# Patient Record
(demographics unavailable — no encounter records)

---

## 2025-07-24 NOTE — CARE PLAN
[Date: ___] : Date: [unfilled] [FreeTextEntry6] : Dr Balbir Gamez- ENT- [phone]; : Mily and NP is Veronika Rodriguez [FreeTextEntry7] : lungs are normal at birth - no special things to do or look for [FreeTextEntry8] : nothing to do [FreeTextEntry9] : continue current feeding; she is doing very well. we monitor for oily stools, excess fussiness

## 2025-07-24 NOTE — BIRTH HISTORY
[At ___ Weeks Gestation] : at [unfilled] weeks gestation [Normal Vaginal Route] : by normal vaginal route [None] : there were no delivery complications [Age Appropriate] : age appropriate developmental milestones met [] : Not  [de-identified] : Good Javi  [FreeTextEntry1] : 6 lb 10 oz [FreeTextEntry6] : 19 in [FreeTextEntry7] : [place] [FreeTextEntry9] : Cat, Chinese [de-identified] : Romanian, Cat, Yemeni

## 2025-07-24 NOTE — PHYSICAL EXAM
[Well Nourished] : well nourished [Well Developed] : well developed [Well Groomed] : well groomed [Alert] : ~L alert [Active] : active [Normal Breathing Pattern] : normal breathing pattern [No Respiratory Distress] : no respiratory distress [No Allergic Shiners] : no allergic shiners [No Drainage] : no drainage [No Conjunctivitis] : no conjunctivitis [Tympanic Membranes Clear] : tympanic membranes were clear [Nasal Mucosa Non-Edematous] : nasal mucosa non-edematous [No Nasal Drainage] : no nasal drainage [No Polyps] : no polyps [No Sinus Tenderness] : no sinus tenderness [No Oral Pallor] : no oral pallor [No Oral Cyanosis] : no oral cyanosis [Non-Erythematous] : non-erythematous [No Exudates] : no exudates [No Postnasal Drip] : no postnasal drip [No Tonsillar Enlargement] : no tonsillar enlargement [Absence Of Retractions] : absence of retractions [Symmetric] : symmetric [Good Expansion] : good expansion [No Acc Muscle Use] : no accessory muscle use [Good aeration to bases] : good aeration to bases [Equal Breath Sounds] : equal breath sounds bilaterally [No Crackles] : no crackles [No Rhonchi] : no rhonchi [No Wheezing] : no wheezing [Normal Sinus Rhythm] : normal sinus rhythm [No Heart Murmur] : no heart murmur [Soft, Non-Tender] : soft, non-tender [No Hepatosplenomegaly] : no hepatosplenomegaly [Non Distended] : was not ~L distended [Abdomen Mass (___ Cm)] : no abdominal mass palpated [Full ROM] : full range of motion [No Clubbing] : no clubbing [Capillary Refill < 2 secs] : capillary refill less than two seconds [No Cyanosis] : no cyanosis [No Petechiae] : no petechiae [No Edema] : no edema [No Kyphoscoliosis] : no kyphoscoliosis [No Contractures] : no contractures [Alert and  Oriented] : alert and oriented [No Abnormal Focal Findings] : no abnormal focal findings [Normal Muscle Tone And Reflexes] : normal muscle tone and reflexes [No Birth Marks] : no birth marks [No Rashes] : no rashes [No Skin Lesions] : no skin lesions

## 2025-07-24 NOTE — BIRTH HISTORY
[At ___ Weeks Gestation] : at [unfilled] weeks gestation [Normal Vaginal Route] : by normal vaginal route [None] : there were no delivery complications [Age Appropriate] : age appropriate developmental milestones met [] : Not  [de-identified] : Good Javi  [FreeTextEntry1] : 6 lb 10 oz [FreeTextEntry6] : 19 in [FreeTextEntry7] : [place] [FreeTextEntry9] : Cat, Greenlandic [de-identified] : Spanish, Cat, Moroccan

## 2025-07-24 NOTE — DISCUSSION/SUMMARY
[FreeTextEntry1] : 6 day old- full term NVD, no delivery complications presently drinking formula every 2-3 hrs without spitting up or vomiting Does make some post- feeding expiratory tight sounding, wheeze-like sounds noted on video that Dad shared. She does not seem to make this sound all the time.  No steatorrhea reported; stool brought in today for fecal elastase and it appears to be a normal stool, no oil noted  will see back in 2-4 weeks; Sweat test  is too big today. will plan for when she is closer to 8 lbs

## 2025-07-24 NOTE — CARE PLAN
[Date: ___] : Date: [unfilled] [FreeTextEntry6] : Dr Balbir Gamez- ENT- [phone]; : Mily and NP is Veronika Rodriguez [FreeTextEntry7] : lungs are normal at birth - no special things to do or look for [FreeTextEntry8] : nothing to do [FreeTextEntry9] : continue current feeding; she is doing very well. we monitor for oily stools, excess fussiness

## 2025-07-24 NOTE — HISTORY OF PRESENT ILLNESS
[Parents] : parents [FreeTextEntry1] : Initial consultation for a 6 day old infant identified prenatally to have CF.  Parents had genetic screening for CF during this pregnancy , both were identified to be carriers and amnio revealed that fetus was affected with CF.  CF Variants identified R117H 7T/ [id number] 7T. New born screen ins pending, IRT was elevated. Sweat test to be completed when infant is larger.   Baby was born at [facility] via vaginal delivery at 38 weeks  Birth weight: 6 lb 10 oz 7/18/25 discharge weight: 6lb 6 oz    7/19/25  weight today: 6lb 5 oz Growth velocity too young to calculate. Returning to PMD on Monday for a weight check.  GI: Feeds q 2.5hrs 2oz  Enfamil Neuro Pro  Baby is contented between feeds. Stools 8x/day - with each feed. Yellow, seedy, no oil   Minimal Spiting up  Pulm: Noisy breathing with feeds.  reviewed the video and concern for noisy expiratory wheezes/ tight sounding   Social: lives in private home with natural parents. [place] [facility]  Mom is on maternity leave until December. Dad works for [employer]- 3 weeks of leave.  All grandparents are vaccinated.

## 2025-07-24 NOTE — HISTORY OF PRESENT ILLNESS
[Parents] : parents [FreeTextEntry1] : Initial consultation for a 6 day old infant identified prenatally to have CF.  Parents had genetic screening for CF during this pregnancy , both were identified to be carriers and amnio revealed that fetus was affected with CF.  CF Variants identified R117H 7T/ [id number] 7T. New born screen ins pending, IRT was elevated. Sweat test to be completed when infant is larger.   Baby was born at [facility] via vaginal delivery at 38 weeks  Birth weight: 6 lb 10 oz 7/18/25 discharge weight: 6lb 6 oz    7/19/25  weight today: 6lb 5 oz Growth velocity too young to calculate. Returning to PMD on Monday for a weight check.  GI: Feeds q 2.5hrs 2oz  Enfamil Neuro Pro  Baby is contented between feeds. Stools 8x/day - with each feed. Yellow, seedy, no oil   Minimal Spiting up  Pulm: Noisy breathing with feeds.  reviewed the video and concern for noisy expiratory wheezes/ tight sounding   Social: lives in private home with natural parents. Living With [facility].  Mom is on maternity leave until December. Dad works for [employer]- 3 weeks of leave.  All grandparents are vaccinated.

## 2025-07-25 NOTE — HISTORY OF PRESENT ILLNESS
[Born at ___ Wks Gestation] : The patient was born at [unfilled] weeks gestation [Other: _____] : at [unfilled] [BW: _____] : weight of [unfilled] [Length: _____] : length of [unfilled] [HC: _____] : head circumference of [unfilled] [DW: _____] : Discharge weight was [unfilled] [Time of Birth: _____] : Time of birth was [unfilled] [] : via normal spontaneous vaginal delivery [G: ___] : G [unfilled] [P: ___] : P [unfilled] [Rubella (Immune)] : Rubella immune [Normal] : Normal [In Bassinet/Crib] : sleeps in bassinet/crib [On back] : sleeps on back [No] : No cigarette smoke exposure [Water heater temperature set at <120 degrees F] : Water heater temperature set at <120 degrees F [Rear facing car seat in back seat] : Rear facing car seat in back seat [Carbon Monoxide Detectors] : Carbon monoxide detectors at home [Smoke Detectors] : Smoke detectors at home. [Hepatitis B Vaccine Given] : Hepatitis B vaccine given [NO] : No [HepBsAG] : HepBsAg negative [HIV] : HIV negative [HepC] : Hepatitis C negative [] : negative [FreeTextEntry9] : O+ [FreeTextEntry8] : p [Exposure to electronic nicotine delivery system] : No exposure to electronic nicotine delivery system [FreeTextEntry7] : prenatally to have CF. Parents had genetic screening for CF during this pregnancy , both were identified to be carriers and amnio revealed that fetus was affected with CF. CF Variants identified R117H 7T/ [id number] 7T. New born screen ins pending, IRT was elevated. Sweat test to be completed when infant is larger. [de-identified] : some breast and similac or neuropro  [de-identified] : 6/18/25

## 2025-07-25 NOTE — PHYSICAL EXAM
[Alert] : alert [Normocephalic] : normocephalic [Flat Open Anterior Corry] : flat open anterior fontanelle [PERRL] : PERRL [Red Reflex Bilateral] : red reflex bilateral [Normally Placed Ears] : normally placed ears [Auricles Well Formed] : auricles well formed [Clear Tympanic membranes] : clear tympanic membranes [Light reflex present] : light reflex present [Bony structures visible] : bony structures visible [Patent Auditory Canal] : patent auditory canal [Nares Patent] : nares patent [Palate Intact] : palate intact [Uvula Midline] : uvula midline [Supple, full passive range of motion] : supple, full passive range of motion [Symmetric Chest Rise] : symmetric chest rise [Clear to Auscultation Bilaterally] : clear to auscultation bilaterally [Regular Rate and Rhythm] : regular rate and rhythm [S1, S2 present] : S1, S2 present [+2 Femoral Pulses] : +2 femoral pulses [Soft] : soft [Bowel Sounds] : bowel sounds present [Umbilical Stump Dry, Clean, Intact] : umbilical stump dry, clean, intact [Normal external genitalia] : normal external genitalia [Patent Vagina] : patent vagina [Patent] : patent [Normally Placed] : normally placed [No Abnormal Lymph Nodes Palpated] : no abnormal lymph nodes palpated [Symmetric Flexed Extremities] : symmetric flexed extremities [Startle Reflex] : startle reflex present [Suck Reflex] : suck reflex present [Rooting] : rooting reflex present [Palmar Grasp] : palmar grasp present [Plantar Grasp] : plantar reflex present [Symmetric Sanchez] : symmetric [place] [Jaundice] : jaundice [Acute Distress] : no acute distress [Icteric sclera] : nonicteric sclera [Discharge] : no discharge [Palpable Masses] : no palpable masses [Murmurs] : no murmurs [Tender] : nontender [Distended] : not distended [Hepatomegaly] : no hepatomegaly [Splenomegaly] : no splenomegaly [Clitoromegaly] : no clitoromegaly [Lewis-Ortolani] : negative Lewis-Ortolani [Spinal Dimple] : no spinal dimple [Tuft of Hair] : no tuft of hair

## 2025-07-25 NOTE — DISCUSSION/SUMMARY
[Normal Growth] : growth [Normal Development] : developmental [No Elimination Concerns] : elimination [Continue Regimen] : feeding [No Skin Concerns] : skin [Normal Sleep Pattern] : sleep [None] : no known medical problems [Anticipatory Guidance Given] : Anticipatory guidance addressed as per the history of present illness section [ Transition] :  transition [ Care] :  care [Nutritional Adequacy] : nutritional adequacy [Parental Well-Being] : parental well-being [Safety] : safety [Hepatitis B In Hospital] : Hepatitis B administered while in the hospital [No Vaccines] : no vaccines needed [No Medications] : ~He/She~ is not on any medications [Parent/Guardian] : Parent/Guardian [Term Infant] : term infant [Parental Concerns Addressed] : Parental concerns addressed [FreeTextEntry1] : 3 do here for  visit.  Both parents CF carriers, mild variant.  Met with Dr. Julio prenatally and will have f/u sweat testing. Per parents, baby could be asymptomatic. They have upcoming appt. with Dr. Julio.   photo threshold 18. RTO Monday for recheck,    Recommend exclusive breastfeeding, 8-12 feedings per day. Baby should have a minimum of 5 diapers in 24 hours. Mother should continue prenatal vitamins and avoid alcohol. If formula is needed, recommend iron-fortified formulations every 2-3 hrs. Can submerge torso in water when umbilical stump falls off. When in car, patient should be in rear-facing car seat in back seat. Air dry umbilical stump. Put baby to sleep on back, in own crib with no loose or soft bedding. Limit baby's exposure to others, especially those with fever or unknown vaccine status.  Recommend one extra layer of clothing.  Contact provider or bring to hospital immediately for temperature of 100.4 or more.

## 2025-07-25 NOTE — PHYSICAL EXAM
[Alert] : alert [Normocephalic] : normocephalic [Flat Open Anterior [place]] : flat open anterior fontanelle [PERRL] : PERRL [Red Reflex Bilateral] : red reflex bilateral [Normally Placed Ears] : normally placed ears [Auricles Well Formed] : auricles well formed [Clear Tympanic membranes] : clear tympanic membranes [Light reflex present] : light reflex present [Bony structures visible] : bony structures visible [Patent Auditory Canal] : patent auditory canal [Nares Patent] : nares patent [Palate Intact] : palate intact [Uvula Midline] : uvula midline [Supple, full passive range of motion] : supple, full passive range of motion [Symmetric Chest Rise] : symmetric chest rise [Clear to Auscultation Bilaterally] : clear to auscultation bilaterally [Regular Rate and Rhythm] : regular rate and rhythm [S1, S2 present] : S1, S2 present [+2 Femoral Pulses] : +2 femoral pulses [Soft] : soft [Bowel Sounds] : bowel sounds present [Umbilical Stump Dry, Clean, Intact] : umbilical stump dry, clean, intact [Normal external genitalia] : normal external genitalia [Patent Vagina] : patent vagina [Patent] : patent [Normally Placed] : normally placed [No Abnormal Lymph Nodes Palpated] : no abnormal lymph nodes palpated [Symmetric Flexed Extremities] : symmetric flexed extremities [Startle Reflex] : startle reflex present [Suck Reflex] : suck reflex present [Rooting] : rooting reflex present [Palmar Grasp] : palmar grasp present [Plantar Grasp] : plantar reflex present [Symmetric Sanchez] : symmetric [place] [Jaundice] : jaundice [Acute Distress] : no acute distress [Icteric sclera] : nonicteric sclera [Discharge] : no discharge [Palpable Masses] : no palpable masses [Murmurs] : no murmurs [Tender] : nontender [Distended] : not distended [Hepatomegaly] : no hepatomegaly [Splenomegaly] : no splenomegaly [Clitoromegaly] : no clitoromegaly [Lewis-Ortolani] : negative Lewis-Ortolani [Spinal Dimple] : no spinal dimple [Tuft of Hair] : no tuft of hair

## 2025-07-25 NOTE — HISTORY OF PRESENT ILLNESS
[Born at ___ Wks Gestation] : The patient was born at [unfilled] weeks gestation [Other: _____] : at [unfilled] [BW: _____] : weight of [unfilled] [Length: _____] : length of [unfilled] [HC: _____] : head circumference of [unfilled] [DW: _____] : Discharge weight was [unfilled] [Time of Birth: _____] : Time of birth was [unfilled] [] : via normal spontaneous vaginal delivery [G: ___] : G [unfilled] [P: ___] : P [unfilled] [Rubella (Immune)] : Rubella immune [Normal] : Normal [In Bassinet/Crib] : sleeps in bassinet/crib [On back] : sleeps on back [No] : No cigarette smoke exposure [Water heater temperature set at <120 degrees F] : Water heater temperature set at <120 degrees F [Rear facing car seat in back seat] : Rear facing car seat in back seat [Carbon Monoxide Detectors] : Carbon monoxide detectors at home [Smoke Detectors] : Smoke detectors at home. [Hepatitis B Vaccine Given] : Hepatitis B vaccine given [NO] : No [HepBsAG] : HepBsAg negative [HIV] : HIV negative [HepC] : Hepatitis C negative [] : negative [FreeTextEntry9] : O+ [FreeTextEntry8] : p [Exposure to electronic nicotine delivery system] : No exposure to electronic nicotine delivery system [FreeTextEntry7] : prenatally to have CF. Parents had genetic screening for CF during this pregnancy , both were identified to be carriers and amnio revealed that fetus was affected with CF. CF Variants identified R117H 7T/ [id number] 7T. New born screen ins pending, IRT was elevated. Sweat test to be completed when infant is larger. [de-identified] : some breast and similac or neuropro  [de-identified] : 6/18/25

## 2025-07-28 NOTE — DISCUSSION/SUMMARY
[FreeTextEntry1] : 10 do here for weight check.  Regained BW ++, doing great.  Stool elastase was cancelled due to label issue, reordered today and parents will drop to the lab.  Follow up at1 month old or sooner PRN concerns.   Recommend exclusive breastfeeding, 8-12 feedings per day. Baby should have a minimum of 5 diapers in 24 hours. Mother should continue prenatal vitamins and avoid alcohol. If formula is needed, recommend iron-fortified formulations every 2-3 hrs. Can submerge torso in water when umbilical stump falls off. When in car, patient should be in rear-facing car seat in back seat. Air dry umbilical stump. Put baby to sleep on back, in own crib with no loose or soft bedding. Limit baby's exposure to others, especially those with fever or unknown vaccine status.  Recommend one extra layer of clothing.  Contact provider or bring to hospital immediately for temperature of 100.4 or more.

## 2025-07-28 NOTE — PHYSICAL EXAM
[No Acute Distress] : no acute distress [NL] : soft, nontender, nondistended, normal bowel sounds, no hepatosplenomegaly [Normal External Genitalia] : normal external genitalia [Patent] : patent [No Abnormal Lymph Nodes Palpated] : no abnormal lymph nodes palpated [Moves All Extremities x 4] : moves all extremities x4 [Sacral Dimple] : no sacral dimple [Normotonic] : normotonic [Warm] : warm

## 2025-07-28 NOTE — HISTORY OF PRESENT ILLNESS
[FreeTextEntry6] : 10 do here for weight check.  Feeding Enfamil Neuro Pro with minimal spitting up.  Stools multiple times a day, not oily.  Some noisy breathing with feeds and post feeding expiratory tight sounding, wheeze like sound on a video that Dr Julio commented on from her visit on 25, however those sounds are inconsistent.  No steatorrhea and fecal elastase was WNL.  Minimum of  GA:38.2  or : BW 3 kg (6 lbs 9 oz)  DC W 2.895 6 lbs 6.1 oz    Both parents with mild CF gene.  They met pre ant post natally with Dr. Julio regarding CF. Nothing to do that this time.